# Patient Record
Sex: FEMALE | Race: WHITE | NOT HISPANIC OR LATINO | Employment: FULL TIME | URBAN - NONMETROPOLITAN AREA
[De-identification: names, ages, dates, MRNs, and addresses within clinical notes are randomized per-mention and may not be internally consistent; named-entity substitution may affect disease eponyms.]

---

## 2023-02-03 ENCOUNTER — OFFICE VISIT (OUTPATIENT)
Dept: OBSTETRICS AND GYNECOLOGY | Facility: CLINIC | Age: 63
End: 2023-02-03
Payer: COMMERCIAL

## 2023-02-03 VITALS
HEIGHT: 65 IN | BODY MASS INDEX: 24.16 KG/M2 | SYSTOLIC BLOOD PRESSURE: 120 MMHG | WEIGHT: 145 LBS | DIASTOLIC BLOOD PRESSURE: 60 MMHG

## 2023-02-03 DIAGNOSIS — N95.2 POSTMENOPAUSAL ATROPHIC VAGINITIS: Primary | ICD-10-CM

## 2023-02-03 DIAGNOSIS — N39.0 FREQUENT UTI: ICD-10-CM

## 2023-02-03 DIAGNOSIS — L81.1 CHLOASMA: ICD-10-CM

## 2023-02-03 DIAGNOSIS — N95.1 MENOPAUSAL SYMPTOMS: ICD-10-CM

## 2023-02-03 PROCEDURE — 99204 OFFICE O/P NEW MOD 45 MIN: CPT | Performed by: OBSTETRICS & GYNECOLOGY

## 2023-02-03 RX ORDER — CYCLOBENZAPRINE HCL 10 MG
TABLET ORAL
COMMUNITY

## 2023-02-03 RX ORDER — LAMOTRIGINE 100 MG/1
TABLET ORAL
COMMUNITY

## 2023-02-03 RX ORDER — LEVOTHYROXINE SODIUM 0.05 MG/1
TABLET ORAL
COMMUNITY

## 2023-02-03 RX ORDER — IMIQUIMOD 12.5 MG/.25G
CREAM TOPICAL
COMMUNITY

## 2023-02-03 RX ORDER — ZOLPIDEM TARTRATE 5 MG/1
TABLET ORAL
COMMUNITY

## 2023-02-03 RX ORDER — ESTRADIOL 0.1 MG/G
CREAM VAGINAL
Qty: 1 EACH | Refills: 11 | Status: SHIPPED | OUTPATIENT
Start: 2023-02-03

## 2023-02-03 RX ORDER — FLUTICASONE PROPIONATE 50 MCG
SPRAY, SUSPENSION (ML) NASAL
COMMUNITY

## 2023-02-03 RX ORDER — VENLAFAXINE HYDROCHLORIDE 75 MG/1
CAPSULE, EXTENDED RELEASE ORAL
COMMUNITY

## 2023-02-03 RX ORDER — CYCLOSPORINE 0.5 MG/ML
EMULSION OPHTHALMIC
COMMUNITY

## 2023-02-03 RX ORDER — ESTRADIOL 10 UG/1
INSERT VAGINAL
COMMUNITY

## 2023-02-03 RX ORDER — MELOXICAM 15 MG/1
TABLET ORAL
COMMUNITY

## 2023-02-03 RX ORDER — PSEUDOEPHEDRINE HCL 30 MG
TABLET ORAL
COMMUNITY

## 2023-02-03 RX ORDER — HYDROXYZINE PAMOATE 25 MG/1
CAPSULE ORAL
COMMUNITY

## 2023-02-03 NOTE — PROGRESS NOTES
Chief Complaint  Vaginal atrophy (Patient complains of vaginal dryness and recurrent UTI symptoms. )     History of Present Illness:  Patient is 62 y.o.  who presents to [facility] here as a new patient for evaluation of vaginal dryness and frequent UTIs.  Patient gives a history of having a previous hysterectomy many years ago.  She had a KYLE.  Patient still has her ovaries.  She had her surgery secondary to fibroids and reports benign pathology.  She has been having menopausal symptoms with hot flashes and night sweats.  She does report though symptoms are improved recently.  She does have complaints however of severe vaginal dryness.  She reports her symptoms are extremely bothersome.  She has frequent UTIs or symptoms of a UTI.  She initially used estrogen cream.  Patient now has been on Vagifem twice weekly with no improvement.  She does have complaints of well as darkening of a facial lesion noted after starting hormone replacement therapy.  Patient does report she may have taken Premarin initially.  She has been seen by dermatology as well.    History  Past Medical History:   Diagnosis Date   • Cancer (HCC)     Melanoma lower eyelid R side s/p surgery 2017   • Disease of thyroid gland 4 years ago    50mcg euthyrox daily   • Fibroid     S/p hysterectomy   • Osteoarthritis     Periodic joint and neck arthritis   • Osteopenia    • Ovarian cyst    • Recurrent pregnancy loss, antepartum condition or complication     2 live births, 3 miscarriages   • Spinal headache     With spinal anesthesia on 89   • Urinary tract infection     Periodically   • Varicella      Current Outpatient Medications on File Prior to Visit   Medication Sig Dispense Refill   • cyclobenzaprine (FLEXERIL) 10 MG tablet cyclobenzaprine 10 mg tablet     • cycloSPORINE (Restasis) 0.05 % ophthalmic emulsion Restasis 0.05 % eye drops in a dropperette   INSTILL 1 DROP TWICE DAILY AS DIRECTED     • docusate sodium  100 MG capsule docusate sodium 100 mg capsule     • estradiol (Yuvafem) 10 MCG tablet vaginal tablet Yuvafem 10 mcg vaginal tablet     • fluticasone (FLONASE) 50 MCG/ACT nasal spray fluticasone propionate 50 mcg/actuation nasal spray,suspension     • hydrOXYzine pamoate (VISTARIL) 25 MG capsule hydroxyzine pamoate 25 mg capsule     • imiquimod (ALDARA) 5 % cream imiquimod 5 % topical cream packet     • lamoTRIgine (LaMICtal) 100 MG tablet lamotrigine 100 mg tablet   TAKE 1 TABLET BY MOUTH ONCE DAILY     • levothyroxine (Euthyrox) 50 MCG tablet Euthyrox 50 mcg tablet     • linaclotide (Linzess) 72 MCG capsule capsule Linzess 72 mcg capsule     • meloxicam (MOBIC) 15 MG tablet meloxicam 15 mg tablet     • tretinoin (RETIN-A) 0.025 % cream tretinoin 0.025 % topical cream   APPLY A PEA SIZED AMOUNT TOPICALLY TO THE FACE AT BEDTIME AVOIDING EYES AND MOUTH     • venlafaxine XR (EFFEXOR-XR) 75 MG 24 hr capsule venlafaxine ER 75 mg capsule,extended release 24 hr     • vitamin D3 (vitamin d) 125 MCG (5000 UT) capsule capsule Daily.     • zolpidem (AMBIEN) 5 MG tablet zolpidem 5 mg tablet     • [DISCONTINUED] erythromycin (ROMYCIN) 5 MG/GM ophthalmic ointment Apply to eye sutures 2 (Two) Times a Day x1 week. 3.5 g 0   • [DISCONTINUED] HYDROcodone-acetaminophen (NORCO) 5-325 MG per tablet Take 1 tablet by mouth Every 4 (Four) Hours As Needed for pain. 10 tablet 0   • [DISCONTINUED] ondansetron (ZOFRAN) 8 MG tablet Take 1 tablet by mouth Every 8 (Eight) Hours As Needed for nausea. 10 tablet 0     No current facility-administered medications on file prior to visit.     No Known Allergies  Past Surgical History:   Procedure Laterality Date   •  SECTION      2 births 89, 92   • D & C WITH SUCTION     • TOTAL ABDOMINAL HYSTERECTOMY       Family History   Problem Relation Age of Onset   • Hypertension Mother    • Diabetes Maternal Grandfather    • Diabetes Paternal Grandmother    • Breast cancer Maternal Aunt   "    Social History     Socioeconomic History   • Marital status:    Tobacco Use   • Smoking status: Never   Vaping Use   • Vaping Use: Never used   Substance and Sexual Activity   • Alcohol use: Not Currently     Comment: 2 beers per week   • Drug use: Not Currently     Types: Marijuana     Comment: In high school   • Sexual activity: Yes     Partners: Male     Birth control/protection: Post-menopausal       Physical Examination:  Vital Signs: /60   Ht 165.1 cm (65\")   Wt 65.8 kg (145 lb)   BMI 24.13 kg/m²     General Appearance: alert, appears stated age, and cooperative  Face:  approx 3 cm dark in discoloration left maxillary region  Breasts: Not performed  Abdomen: no masses, no hepatomegaly, no splenomegaly, soft non-tender, no guarding, and no rebound tenderness  Pelvic: Clinical staff was present for exam  External genitalia:  normal appearance of the external genitalia including Bartholin's and [place]'s glands.  :  urethral meatus normal;  Vaginal:  atrophic mucosal changes are present;  Cervix:  absent.  Uterus:  absent.  Adnexa:  non palpable bilaterally.    Data Review:  The following data was reviewed by: Niki Camarillo MD on 02/03/2023:     Labs:    Imaging:    Medical Records:  None    Assessment and Plan   1. Postmenopausal atrophic vaginitis  Discussed various options for relief of atrophic vaginal symptoms related to menopause. Discussed local therapy for treatment of vaginal symptoms only.  Discussed the different formulation options including cream, ring, and tablets.  Discussed the low risk of systemic absorption in postmenopausal women with atrophy using 25 mcgs of estradiol on a daily basis.  Recommend low dose use 2-3x/wk for maintenance of treatment.  Other treatment options were discussed including the use of water-based and silicone-based vaginal lubricants and moisturizers.  Also discussed was the FDA approved treatment option of ospemifene for moderate to severe " dyspareunia.  Prescription is given for Estring as noted.  Instructions and precautions are given.  Patient is to follow-up in 3 to 4 weeks.  - estradiol (ESTRING) 2 MG vaginal ring; Insert one ring into the vagina every three months.  Dispense: 1 each; Refill: 4    2. Menopausal symptoms  The various options for the management of menopausal symptoms was discussed.  The medical treatment options discussed include HRT, SSRIs, SSNRIs, clonidine, and gabapentin.  The risks and benefits were discussed including the findings from the WHI study.  The increased risk of breast cancer, CAD, stroke, and VTE events were discussed for combination therapy vs the increased risk of CV events and breast cancer not being seen in the estrogen only group.  The lowest effective dose for the shortest duration of treatment was discussed in regards to HRT.  Other alternatives including otc supplements and lifestyle changes were also discussed.  Local estrogen therapy to relieve atrophic vaginal symptoms was discussed was well as other alternatives.  - estradiol (ESTRING) 2 MG vaginal ring; Insert one ring into the vagina every three months.  Dispense: 1 each; Refill: 4    3. Frequent UTI  Patient with frequent UTIs as noted.  Prescription is given for estrogen cream.  Patient is to apply to the periurethral area as discussed.  - estradiol (ESTRACE VAGINAL) 0.1 MG/GM vaginal cream; Use 0.5 grams intravaginally twice weekly to control symptoms.  Dispense: 1 each; Refill: 11    4. Chloasma  Patient with findings consistent with melasma.  Patient is currently on Retin-A.  I have discussed with the patient follow-up with dermatology or a plastic surgeon to discuss possible laser treatments or other treatments.    Follow Up/Instructions:  Follow up as noted.  Patient was given instructions and counseling regarding her condition or for health maintenance advice. Please see specific information pulled into the AVS if appropriate.     Note: Speech  recognition transcription software may have been used to dictate portions of this document.  An attempt at proofreading has been made though minor errors in transcription may still be present.    This note was electronically signed.  Niki Camarillo M.D.

## 2023-02-08 ENCOUNTER — TELEPHONE (OUTPATIENT)
Dept: OBSTETRICS AND GYNECOLOGY | Facility: CLINIC | Age: 63
End: 2023-02-08
Payer: COMMERCIAL

## 2023-02-08 NOTE — TELEPHONE ENCOUNTER
Patient's pharmacy called requesting a prior authorization for the Estring. I attempted to submit one online but was unsuccessful. I called [facility] and spoke with Christian(REF# [id number]) and he gave me the number for Pharmacy Benefit [phone]. I called and spoke with Jose and was able to get an authorization. Estring 2mg was approved through 02/28/2024. Case #[id number]. I called [facility] pharmacy to make her aware of the medcation she had been approved, it was reran under insurance and it will cost $300 out of pocket. I will contact patient and make her aware.

## 2024-02-28 DIAGNOSIS — N95.2 POSTMENOPAUSAL ATROPHIC VAGINITIS: ICD-10-CM

## 2024-02-28 DIAGNOSIS — N95.1 MENOPAUSAL SYMPTOMS: ICD-10-CM

## 2024-02-28 RX ORDER — ESTRADIOL 2 MG/1
SYSTEM VAGINAL
Qty: 1 EACH | Refills: 0 | Status: SHIPPED | OUTPATIENT
Start: 2024-02-28

## 2024-03-12 ENCOUNTER — TELEPHONE (OUTPATIENT)
Dept: OBSTETRICS AND GYNECOLOGY | Facility: CLINIC | Age: 64
End: 2024-03-12
Payer: COMMERCIAL

## 2024-03-12 NOTE — TELEPHONE ENCOUNTER
Caller: Denisse Chiu    Relationship: Self    Best call back number: [phone]    Which medication are you concerned about: ESTRING 7.5 MCG/24 HR RING    Who prescribed you this medication: DR. MANSFIELD    PATIENT'S INSURANCE NO LONGER PAYING FOR THIS MEDICATION. PATIENT WOULD LIKE SOMETHING SIMILAR THAT HER INSURANCE WILL HELP TAKE CARE OF. SHE SAID THAT SHE HAS HAD FAGIFEM GENERIC BEFORE AND IT WORKED.     PHARMACY - [facility] IN [place]     PATIENT WOULD LIKE A 90 DAY SUPPLY.

## 2024-03-14 RX ORDER — ESTRADIOL 10 UG/1
1 INSERT VAGINAL 2 TIMES WEEKLY
Qty: 8 TABLET | Refills: 11 | Status: SHIPPED | OUTPATIENT
Start: 2024-03-14

## 2024-04-25 ENCOUNTER — TRANSCRIBE ORDERS (OUTPATIENT)
Dept: ADMINISTRATIVE | Facility: HOSPITAL | Age: 64
End: 2024-04-25
Payer: COMMERCIAL

## 2024-04-25 DIAGNOSIS — R10.2 PELVIC PAIN SYNDROME: Primary | ICD-10-CM

## 2024-08-02 ENCOUNTER — OFFICE VISIT (OUTPATIENT)
Dept: OBSTETRICS AND GYNECOLOGY | Facility: CLINIC | Age: 64
End: 2024-08-02
Payer: COMMERCIAL

## 2024-08-02 VITALS
DIASTOLIC BLOOD PRESSURE: 70 MMHG | WEIGHT: 148.8 LBS | SYSTOLIC BLOOD PRESSURE: 124 MMHG | HEIGHT: 65 IN | BODY MASS INDEX: 24.79 KG/M2

## 2024-08-02 DIAGNOSIS — Z12.72 VAGINAL PAP SMEAR: ICD-10-CM

## 2024-08-02 DIAGNOSIS — N95.1 VAGINAL DRYNESS, MENOPAUSAL: ICD-10-CM

## 2024-08-02 DIAGNOSIS — Z01.419 WELL WOMAN EXAM WITH ROUTINE GYNECOLOGICAL EXAM: Primary | ICD-10-CM

## 2024-08-02 PROCEDURE — 99396 PREV VISIT EST AGE 40-64: CPT | Performed by: PHYSICIAN ASSISTANT

## 2024-08-02 RX ORDER — BUSPIRONE HYDROCHLORIDE 5 MG/1
TABLET ORAL
COMMUNITY
Start: 2024-02-05

## 2024-08-02 RX ORDER — AMOXICILLIN 250 MG
CAPSULE ORAL
COMMUNITY

## 2024-08-02 RX ORDER — CETIRIZINE HYDROCHLORIDE 10 MG/1
10 TABLET ORAL DAILY PRN
COMMUNITY
Start: 2024-05-09

## 2024-08-02 RX ORDER — AZITHROMYCIN 250 MG/1
TABLET, FILM COATED ORAL
COMMUNITY
Start: 2024-05-09

## 2024-08-02 NOTE — PROGRESS NOTES
Subjective   Chief Complaint   Patient presents with    Gynecologic Exam       MMG 24 WNL. Requesting Estring refill.        Denisse Chiu is a 64 y.o. year old  presenting to be seen for her annual gynecological exam.   She is currently using Yuvafem but wanting to go back to Estring as it worked better  Vagifem does not work well for dryness. Estrogen cream has not been effective for dryness either  Normal screening mammogram in january     Past Medical History:   Diagnosis Date    Cancer     Melanoma lower eyelid R side s/p surgery 2017    Disease of thyroid gland 4 years ago    50mcg euthyrox daily    Fibroid     S/p hysterectomy    Osteoarthritis     Periodic joint and neck arthritis    Osteopenia     Ovarian cyst     Recurrent pregnancy loss, antepartum condition or complication     2 live births, 3 miscarriages    Spinal headache     With spinal anesthesia on 89    Urinary tract infection     Periodically    Varicella         Current Outpatient Medications:     azithromycin (ZITHROMAX) 250 MG tablet, , Disp: , Rfl:     busPIRone (BUSPAR) 5 MG tablet, Take 1 tablet twice a day by oral route as needed for 30 days., Disp: , Rfl:     cetirizine (zyrTEC) 10 MG tablet, Take 1 tablet by mouth Daily As Needed for Allergies., Disp: , Rfl:     Cobalamin Combinations (B-12) 100-5000 MCG sublingual tablet, , Disp: , Rfl:     cyclobenzaprine (FLEXERIL) 10 MG tablet, cyclobenzaprine 10 mg tablet, Disp: , Rfl:     cycloSPORINE (Restasis) 0.05 % ophthalmic emulsion, Restasis 0.05 % eye drops in a dropperette  INSTILL 1 DROP TWICE DAILY AS DIRECTED, Disp: , Rfl:     Diclofenac Sodium (VOLTAREN) 1 % gel gel, Apply 4 g topically to the appropriate area as directed 4 (Four) Times a Day As Needed (muscular pain)., Disp: 100 g, Rfl: 0    docusate sodium 100 MG capsule, docusate sodium 100 mg capsule, Disp: , Rfl:     estradiol (VAGIFEM) 10 MCG tablet vaginal tablet, Insert 1 tablet into the vagina 2 (Two) Times a  Week., Disp: 8 tablet, Rfl: 11    fluticasone (FLONASE) 50 MCG/ACT nasal spray, fluticasone propionate 50 mcg/actuation nasal spray,suspension, Disp: , Rfl:     hydrOXYzine pamoate (VISTARIL) 25 MG capsule, hydroxyzine pamoate 25 mg capsule, Disp: , Rfl:     imiquimod (ALDARA) 5 % cream, imiquimod 5 % topical cream packet, Disp: , Rfl:     lamoTRIgine (LaMICtal) 100 MG tablet, lamotrigine 100 mg tablet  TAKE 1 TABLET BY MOUTH ONCE DAILY, Disp: , Rfl:     levothyroxine (Euthyrox) 50 MCG tablet, Euthyrox 50 mcg tablet, Disp: , Rfl:     linaclotide (Linzess) 72 MCG capsule capsule, Linzess 72 mcg capsule, Disp: , Rfl:     meloxicam (MOBIC) 15 MG tablet, meloxicam 15 mg tablet, Disp: , Rfl:     tretinoin (RETIN-A) 0.025 % cream, tretinoin 0.025 % topical cream  APPLY A PEA SIZED AMOUNT TOPICALLY TO THE FACE AT BEDTIME AVOIDING EYES AND MOUTH, Disp: , Rfl:     venlafaxine XR (EFFEXOR-XR) 75 MG 24 hr capsule, venlafaxine ER 75 mg capsule,extended release 24 hr, Disp: , Rfl:     vitamin D3 (vitamin d) 125 MCG (5000 UT) capsule capsule, Daily., Disp: , Rfl:     zolpidem (AMBIEN) 5 MG tablet, zolpidem 5 mg tablet, Disp: , Rfl:     Estradiol Acetate 0.05 MG/24HR ring, Insert 1 Ring into the vagina Every 3 (Three) Months., Disp: 1 each, Rfl: 4   No Known Allergies   Past Surgical History:   Procedure Laterality Date     SECTION      2 births 89, 92    D & C WITH SUCTION      TOTAL ABDOMINAL HYSTERECTOMY        Social History     Socioeconomic History    Marital status:    Tobacco Use    Smoking status: Never    Smokeless tobacco: Never   Vaping Use    Vaping status: Never Used   Substance and Sexual Activity    Alcohol use: Not Currently     Comment: 2 beers per week    Drug use: Not Currently     Types: Marijuana     Comment: In high school    Sexual activity: Yes     Partners: Male     Birth control/protection: Post-menopausal      Family History   Problem Relation Age of Onset    Hypertension Mother  "    Diabetes Maternal Grandfather     Diabetes Paternal Grandmother     Breast cancer Maternal Aunt        Review of Systems   Constitutional:  Negative for chills, diaphoresis and fever.   Gastrointestinal:  Negative for constipation, diarrhea, nausea and vomiting.   Genitourinary:  Positive for vaginal pain. Negative for difficulty urinating, dysuria, pelvic pain and vaginal discharge.           Objective   /70   Ht 165.1 cm (65\")   Wt 67.5 kg (148 lb 12.8 oz)   BMI 24.76 kg/m²     Physical Exam  Exam conducted with a chaperone present.   Constitutional:       Appearance: Normal appearance. She is well-developed and well-groomed.   Eyes:      General: Lids are normal.      Extraocular Movements: Extraocular movements intact.      Conjunctiva/sclera: Conjunctivae normal.   Chest:   Breasts:     Breasts are symmetrical.      Right: No inverted nipple, mass, nipple discharge, skin change or tenderness.      Left: No inverted nipple, mass, nipple discharge, skin change or tenderness.   Abdominal:      General: There is no distension.      Palpations: Abdomen is soft.      Tenderness: There is no abdominal tenderness.   Genitourinary:     Labia:         Right: No rash, tenderness or lesion.         Left: No rash, tenderness or lesion.       Urethra: No prolapse, urethral pain, urethral swelling or urethral lesion.      Vagina: No vaginal discharge, tenderness or lesions.      Uterus: Absent.       Adnexa:         Right: No mass or tenderness.          Left: No mass or tenderness.     Lymphadenopathy:      Upper Body:      Right upper body: No axillary adenopathy.      Left upper body: No axillary adenopathy.   Neurological:      General: No focal deficit present.      Mental Status: She is alert and oriented to person, place, and time.   Psychiatric:         Attention and Perception: Attention normal.         Mood and Affect: Mood normal.         Speech: Speech normal.         Behavior: Behavior is " cooperative.            Result Review :                   Assessment and Plan  Diagnoses and all orders for this visit:    1. Well woman exam with routine gynecological exam (Primary)    2. Vaginal Pap smear  -     LIQUID-BASED PAP SMEAR WITH HPV GENOTYPING REGARDLESS OF INTERPRETATION (HALIE,COR,MAD)    3. Vaginal dryness, menopausal    Other orders  -     Estradiol Acetate 0.05 MG/24HR ring; Insert 1 Ring into the vagina Every 3 (Three) Months.  Dispense: 1 each; Refill: 4      Patient Instructions   Self breast exam monthly  Annual mammogram  Calcium 1200 mg daily and vit D 2000 mg daily  Regular excercise            This note was electronically signed.    Yvonne Fraser PA-C   August 2, 2024

## 2024-08-06 ENCOUNTER — TELEPHONE (OUTPATIENT)
Dept: OBSTETRICS AND GYNECOLOGY | Facility: CLINIC | Age: 64
End: 2024-08-06
Payer: COMMERCIAL

## 2024-08-06 NOTE — TELEPHONE ENCOUNTER
Pharmacy called stating that The femring is not covered under her prescription plan. They will cover tablets, patches, cream or insert. Do you want to send in something else or we can try to get a PA

## 2024-08-09 LAB — REF LAB TEST METHOD: NORMAL

## 2024-08-09 RX ORDER — FLUCONAZOLE 150 MG/1
TABLET ORAL
Qty: 2 TABLET | Refills: 0 | Status: SHIPPED | OUTPATIENT
Start: 2024-08-09

## 2024-09-03 RX ORDER — ESTRADIOL 10 UG/1
1 TABLET VAGINAL 2 TIMES WEEKLY
Qty: 8 TABLET | Refills: 11 | Status: SHIPPED | OUTPATIENT
Start: 2024-09-05

## 2025-01-15 ENCOUNTER — TELEPHONE (OUTPATIENT)
Dept: OBSTETRICS AND GYNECOLOGY | Facility: CLINIC | Age: 65
End: 2025-01-15
Payer: COMMERCIAL

## 2025-01-15 NOTE — TELEPHONE ENCOUNTER
Caller: Denisse Chiu    Relationship: Self    Best call back number: [phone]    What medication are you requesting: EFTRING 2MG - VAGINAL RING THAT IS INSERTED EVERY 3 MOS    What are your current symptoms: TO REPLACE CURRENT PRESCRIPTION DUE TO INSURANCE NOT COVERING      If a prescription is needed, what is your preferred pharmacy and phone number:  [facility]    Additional notes: IF ANY QUESTIONS PLEASE CALL PT

## 2025-01-16 RX ORDER — ESTRADIOL 2 MG/1
1 SYSTEM VAGINAL
Qty: 1 EACH | Refills: 2 | Status: SHIPPED | OUTPATIENT
Start: 2025-01-16

## 2025-06-02 ENCOUNTER — TRANSCRIBE ORDERS (OUTPATIENT)
Dept: ADMINISTRATIVE | Facility: HOSPITAL | Age: 65
End: 2025-06-02
Payer: COMMERCIAL

## 2025-06-02 DIAGNOSIS — T85.43XA LEAKAGE OF BREAST PROSTHESIS AND IMPLANT, INITIAL ENCOUNTER: Primary | ICD-10-CM

## 2025-07-08 ENCOUNTER — HOSPITAL ENCOUNTER (OUTPATIENT)
Dept: ULTRASOUND IMAGING | Facility: HOSPITAL | Age: 65
Discharge: HOME | End: 2025-07-08
Payer: MEDICARE

## 2025-07-08 ENCOUNTER — HOSPITAL ENCOUNTER (OUTPATIENT)
Dept: MAMMOGRAPHY | Facility: HOSPITAL | Age: 65
Discharge: HOME | End: 2025-07-08
Payer: MEDICARE

## 2025-07-08 DIAGNOSIS — T85.43XA LEAKAGE OF BREAST PROSTHESIS AND IMPLANT, INITIAL ENCOUNTER: ICD-10-CM

## 2025-07-08 PROCEDURE — 77066 DX MAMMO INCL CAD BI: CPT

## 2025-07-08 PROCEDURE — G0279 TOMOSYNTHESIS, MAMMO: HCPCS

## 2025-07-08 PROCEDURE — 76642 ULTRASOUND BREAST LIMITED: CPT
